# Patient Record
Sex: MALE | Race: WHITE | NOT HISPANIC OR LATINO | Employment: FULL TIME | URBAN - METROPOLITAN AREA
[De-identification: names, ages, dates, MRNs, and addresses within clinical notes are randomized per-mention and may not be internally consistent; named-entity substitution may affect disease eponyms.]

---

## 2017-02-07 ENCOUNTER — OFFICE VISIT (OUTPATIENT)
Dept: OTOLARYNGOLOGY | Facility: CLINIC | Age: 72
End: 2017-02-07
Payer: MEDICARE

## 2017-02-07 VITALS
HEART RATE: 51 BPM | DIASTOLIC BLOOD PRESSURE: 73 MMHG | WEIGHT: 188.06 LBS | SYSTOLIC BLOOD PRESSURE: 141 MMHG | BODY MASS INDEX: 24.81 KG/M2

## 2017-02-07 DIAGNOSIS — Z85.818 HISTORY OF PAROTID CANCER: Primary | ICD-10-CM

## 2017-02-07 DIAGNOSIS — H60.391 INFECTIOUS OTITIS EXTERNA, RIGHT: ICD-10-CM

## 2017-02-07 DIAGNOSIS — H61.301 EXTERNAL EAR CANAL STENOSIS, ACQUIRED, RIGHT: ICD-10-CM

## 2017-02-07 DIAGNOSIS — G51.0 FACIAL PARALYSIS ON RIGHT SIDE: ICD-10-CM

## 2017-02-07 DIAGNOSIS — H91.93 BILATERAL HEARING LOSS, UNSPECIFIED HEARING LOSS TYPE: ICD-10-CM

## 2017-02-07 DIAGNOSIS — H61.22 IMPACTED CERUMEN OF LEFT EAR: ICD-10-CM

## 2017-02-07 PROCEDURE — 69210 REMOVE IMPACTED EAR WAX UNI: CPT | Mod: S$GLB,,, | Performed by: OTOLARYNGOLOGY

## 2017-02-07 PROCEDURE — 99999 PR PBB SHADOW E&M-EST. PATIENT-LVL II: CPT | Mod: PBBFAC,,, | Performed by: OTOLARYNGOLOGY

## 2017-02-07 PROCEDURE — 99204 OFFICE O/P NEW MOD 45 MIN: CPT | Mod: 25,S$GLB,, | Performed by: OTOLARYNGOLOGY

## 2017-02-07 RX ORDER — CIPROFLOXACIN AND DEXAMETHASONE 3; 1 MG/ML; MG/ML
4 SUSPENSION/ DROPS AURICULAR (OTIC) 2 TIMES DAILY
Qty: 7.5 ML | Refills: 6 | Status: SHIPPED | OUTPATIENT
Start: 2017-02-07 | End: 2017-02-17

## 2017-02-07 RX ORDER — ATENOLOL 50 MG/1
50 TABLET ORAL 2 TIMES DAILY
Refills: 1 | COMMUNITY
Start: 2017-01-17

## 2017-02-07 NOTE — PROGRESS NOTES
Referring Provider:    Belinda Buenrostro  [facility] [place]  [address] [place] [address]    Subjective:   Patient: Taqueria Lee [id number], :1945   Visit date:2017 5:06 PM    Chief Complaint:  Hearing Loss    HPI:  Taqueria is a 71 y.o. male who I was asked to see in consultation for evaluation of the following issue(s):    This is a very pleasant gentleman sitting me today for the first time.  He has several different head and neck issues.  Several years ago, he had a right total parotidectomy for malignancy followed by 75 Gy radiation therapy adjuvantly.  Since then, he has had a right facial paralysis.  He is unable to completely close the eye but he uses artificial tears and overall has done fairly well with this.  He has had no noticeable swelling in the neck or enlarged lymph nodes.  His weight has been stable.  Since completion of therapy, he has had severe narrowing of the right external auditory canal.  He has had multiple issues with the ear becoming occluded with wax or other debris.  At some point in the past, he tried to use a mixture of peroxide and alcohol preventative fluid but he is not really performed any type of therapy in the last few years.  He reports that over the past month or so, the left ear has become more obstructed.  He feels like his hearing on this side is decreased.  On the right side he has had some itching.    Review of Systems:  Negative unless checked off.  Gen:  []fever   []fatigue  HENT:  []nosebleeds  []dental problem   Eyes:  []photophobia  []visual disturbance  Resp:  []chest tightness []wheezing  Card:  []chest pain  []leg swelling  GI:  []abdominal pain []blood in stool  :  []dysuria  []hematuria  Musc:  []joint swelling  []gait problem  Skin:  []color change  []pallor  Neuro:  []seizures  []numbness  Hem:  []bruise/bleed easily  Psych:  []hallucinations  []behavioral problems  Allergy/Imm: has No Known  Allergies.    His meds, allergies, medical, surgical, social & family histories were reviewed & updated:  -     He has a current medication list which includes the following prescription(s): atenolol and ciprofloxacin-dexamethasone 0.3-0.1%.  -     He  has a past medical history of Hypertension and Skin cancer.   -     He  does not have any pertinent problems on file.   -     He  has a past surgical history that includes Hernia repair; Skin cancer excision; and Salivary gland surgery.  -     He  reports that he has been smoking Cigarettes.  He has been smoking about 1.00 pack per day. He does not have any smokeless tobacco history on file. He reports that he does not drink alcohol or use illicit drugs.  -     His family history includes COPD in his father; Heart disease in his father and mother; Hypertension in his father and mother; Stroke in his father.  -     He has No Known Allergies.    Objective:     Physical Exam:  Vitals:    Visit Vitals    BP (!) 141/73    Pulse (!) 51    Wt 85.3 kg (188 lb 0.8 oz)    BMI 24.81 kg/m2     General appearance:  Well developed, well nourished    Eyes:  Extraocular motions intact, PERRL    Communication:  no hoarseness, no dysphonia    Ears:  Right external auditory canals stenotic at the meatus.  Within the ear canal there is copious accumulation of foul smelling debris as well as some associated inflammation of the external auditory canal.  This was carefully cleaned and the tympanic membrane appeared intact.  The left external auditory canal had a large volume of cerumen without any evidence of infection.  Anatomically the ear canal is normal.  The tympanic membrane was normal and the left side.  Nose:  No masses/lesions of external nose, nasal mucosa, septum, and turbinates were within normal limits.  Mouth:  No mass/lesion of lips, teeth, gums, hard/soft palate, tongue, tonsils, or oropharynx.    Cardiovascular:  No pedal edema; Radial Pulses +2     Neck & Lymphatics:   No cervical lymphadenopathy, no neck mass/crepitus/ asymmetry, trachea is midline, no thyroid enlargement/tenderness/mass.  Significant contour defect of the right side of the face with well-healed incisions indicating a modified Luisito incision combined with an extension for what was most likely a modified radical neck dissection.  There is no evidence of palpable masses within the parotid bed or within the neck bilaterally.    Psych: Oriented x3,  Alert with normal mood and affect.     Respiration/Chest:  Symmetric expansion during respiration, normal respiratory effort.    Skin:  Warm and intact. No ulcerations of face, scalp, neck.    Facial nerve function is House Brackmann score of 6 out of 6.    Assessment & Plan:   Taqueria was seen today for hearing loss.    Diagnoses and all orders for this visit:    History of parotid cancer    External ear canal stenosis, acquired, right    Infectious otitis externa, right  -     ciprofloxacin-dexamethasone 0.3-0.1% (CIPRODEX) 0.3-0.1 % DrpS; Place 4 drops into the right ear 2 (two) times daily.    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  There is no evidence of residual or recurrent disease.    Ear Canal Stenosis-  overall, he has significant symptoms related to this with the accumulation of debris within the ear canal which combined with lack of aeration of the ear canal makes him at considerable risk for recurrent or chronic otitis externa.  He has not been on any type of management therapy to try to prevent this.  It is also unclear if he would have normal hearing when the ear is not infected.  I recommended that he obtain an audiogram after the current infection has resolved.  I have also recommended that he try to maintain the ear with a combination of distilled  vinegar and rubbing alcohol which can assist in drying the ear canal as well as maintain a normal pH.  If he continues to have ear infections despite conservative management or if this is leading to significant hearing impairment (including the inability to place a hearing aid within the ear canal) we could consider meatoplasty in order to attempt to provide a more widely patent ear canal.  At this point, I would recommend trying conservative measures and observe over the next few months.  If he continues to have significant issues with this, we can discuss surgical options at that point.    OTITIS EXTERNA-  Taqueria has evidence of right otitis externa.   We discussed the need for dry ear precautions. For acute infection, antibiotic therapy is needed.   It remains unclear if this is bacterial or fungal in nature.  I do suspect this is likely bacterial.  I have placed him on a course of Ciprodex.  We will have him return in about 2 weeks.  If the ear appears infected at that point, I would recommend that we obtain cultures and empirically start him on clotrimazole.     -HEARING LOSS-  I spent a considerable amount of time educating the patient on hearing and hearing loss.  We discussed the basic characteristics of conductive hearing loss versus sensorineural hearing loss and the significant differences in treatment options between the two categories.      We discussed that in cases of conductive hearing loss, this suggests a mechanical disorder that sometimes can be improved with medications &/or surgery.  It may occur secondary to external ear pathology (atresia, otitis externa, etc), tympanic membrane disorders (large perforations, immobility due to scarring or eustachian tube dysfunction), and middle ear disorders (effusions, ossicular disorders).    Sensorineural hearing loss is the expected hearing loss pattern with aging, but some disorders such as Meniere's may accelerate this process.  Additionally,  amplification with hearing aids is generally the best option for hearing rehabilitation, except where the hearing loss is profound.  We discussed that this generally does not represent a dangerous condition, but in cases where there is a large discrepancy between the two ears in terms of nerve function, more investigation is often necessary due to the possiblity of vestibular schwannomas or meningiomas at the cerebellopontine angle.  The definitive test for this is an MRI with gadolinium.  However, these masses are usually very slow growing (1-2mm/year), so patients may elect to repeat an audiogram in about 6 months or obtain an ABR so long as they understand that this may result in a delay in diagnosis (although very unlikely that this would have a significant clinical impact on their outcome due to the slow growing nature of these masses) with the understanding that if ABR is abnormal or asymmetry increases, an MRI would then be required.    Taqueria  presents with what appears to be bilateral hearing loss.  Based on this, my recommendation is audiogram after infection on the right has resolved.   Likely sensorineural bilaterally, but hearing amplification may be difficult in the right ear.     -     Cerumen Impaction - Taqueria has cerumen in the left ear(s).  We discussed preventative measures and treatment options.  Q-tips must be avoided, instead the ears can be cleaned with OTC ear rinses (or mineral oil).  If the cerumen impacts the ear canal and causes hearing loss or infection he needs to follow-up in the clinic for treatment and cleaning.      We discussed his medical conditions, treatments and plan.  Taqueria should return to clinic if any issues arise (symptoms worsen or persist), otherwise we will see him back in the clinic In 2 weeks.    Thank you for allowing me to participate in the care of Taqueria.    Sumit Diaz MD        Patient: Taqueria [place] Jesus [id number], :1945  Procedure date:2017  Patient's  medications, allergies, past medical, surgical, social and family histories were reviewed and updated as appropriate.  Chief Complaint:  Hearing Loss    HPI:  Taqueria is a 71 y.o. male with the history of present illness as discussed in the clinic note from today.  During this unrelated patient encounter I observed impacted cerumen.  The otoscopic examination of the tympanic membrane was not possible due to copious cerumen impaction.      Procedure: The patient was in agreement with the examination and debridement of the ears. Removal of the cerumen required a high level of expertise and use of an operating microscope and multiple micro-instruments.     With the patient in the supine position, we used the operating microscope to examine both ears with the appropriate sized ear speculum.  A variety of sterile, micro-instruments were utilized to remove the cerumen atraumatically.  I performed the procedure which required a significant amount of time and effort. The tympanic membrane was then well visualized.  The patient tolerated the procedure well and there were no complications.    Findings:   Right ear had significant wax, the EAC was discharge present, macerated, excoriated and stenotic at the meatus, and the tympanic membrane was intact with no evidence of middle ear fluid.    Left ear had significant wax, the EAC was normal, and the tympanic membrane was intact with no evidence of middle ear fluid.

## 2017-02-07 NOTE — MR AVS SNAPSHOT
[facility]  [address]  [place] [address]  Phone: [phone]  Fax: [phone]                  Taqueria Lee   2017 3:45 PM   Office Visit    Description:  Male : 1945   Provider:  Sumit Diaz MD   Department:  [facility]           Reason for Visit     Hearing Loss                To Do List           Future Appointments        Provider Department Dept Phone    2017 8:20 AM Jaye Whitten PA-C [facility] [phone]      Goals (5 Years of Data)     None       These Medications        Disp Refills Start End    ciprofloxacin-dexamethasone 0.3-0.1% (CIPRODEX) 0.3-0.1 % DrpS 7.5 mL 6 2017    Place 4 drops into the right ear 2 (two) times daily. - Right Ear    Pharmacy: [id number] - [place] - [address] AT [facility] Ph #: [phone]         [facility] On Call     [facility] On Call Nurse Care Line -  Assistance  Registered nurses in the [facility] On Call Center provide clinical advisement, health education, appointment booking, and other advisory services.  Call for this free service at [phone].             Medications           Message regarding Medications     Verify the changes and/or additions to your medication regime listed below are the same as discussed with your clinician today.  If any of these changes or additions are incorrect, please notify your healthcare provider.        START taking these NEW medications        Refills    ciprofloxacin-dexamethasone 0.3-0.1% (CIPRODEX) 0.3-0.1 % DrpS 6    Sig: Place 4 drops into the right ear 2 (two) times daily.    Class: Normal    Route: Right Ear      STOP taking these medications     meclizine (ANTIVERT) 25 mg tablet Take 1 tablet (25 mg total) by mouth 3 (three) times daily as needed.    amlodipine (NORVASC) 5 MG tablet Take 5 mg by mouth once daily.    lisinopril (PRINIVIL,ZESTRIL) 20  MG tablet Take 20 mg by mouth once daily.           Verify that the below list of medications is an accurate representation of the medications you are currently taking.  If none reported, the list may be blank. If incorrect, please contact your healthcare provider. Carry this list with you in case of emergency.           Current Medications     atenolol (TENORMIN) 50 MG tablet Take 50 mg by mouth 2 (two) times daily.    ciprofloxacin-dexamethasone 0.3-0.1% (CIPRODEX) 0.3-0.1 % DrpS Place 4 drops into the right ear 2 (two) times daily.           Clinical Reference Information           Your Vitals Were     BP Pulse Weight BMI       141/73 51 85.3 kg (188 lb 0.8 oz) 24.81 kg/m2       Blood Pressure          Most Recent Value    BP  (!)  141/73      Allergies as of 2/7/2017     No Known Allergies      Immunizations Administered on Date of Encounter - 2/7/2017     None      [facility] Sign-Up     Activating your [facility] account is as easy as 1-2-3!     1) Visit [email or web address], select Sign Up Now, enter this activation code and your date of birth, then select Next.  [email or web address]  Expires: 3/24/2017  4:51 PM      2) Create a username and password to use when you visit [facility] in the future and select a security question in case you lose your password and select Next.    3) Enter your e-mail address and click Sign Up!    Additional Information  If you have questions, please e-mail [email or web address] or call [phone] to talk to our [facility] staff. Remember, [facility] is NOT to be used for urgent needs. For medical emergencies, dial 911.         Smoking Cessation     If you would like to quit smoking:   You may be eligible for free services if you are a [place] resident and started smoking cigarettes before September 1, 1988.  Call the [employer]oking [employer]essation [employer] (S[employer]) toll free at (109) 462-1336 or (392) 365-2566.   Call 9-000-QUIT-NOW if you do not meet the above criteria.            Language Assistance  Services     ATTENTION: Language assistance services are available, free of charge. Please call [phone].      ATENCIÓN: Si habla espinoza, tiene a lopez disposición servicios gratuitos de asistencia lingüística. Llame al [phone].     CHÚ Ý: N?u b?n nói Ti?ng Vi?t, có các d?ch v? h? tr? ngôn ng? mi?n phí dành cho b?n. G?i s? [phone].         [facility] complies with applicable Federal civil rights laws and does not discriminate on the basis of race, color, national origin, age, disability, or sex.

## 2017-02-21 ENCOUNTER — OFFICE VISIT (OUTPATIENT)
Dept: OTOLARYNGOLOGY | Facility: CLINIC | Age: 72
End: 2017-02-21
Payer: MEDICARE

## 2017-02-21 VITALS
SYSTOLIC BLOOD PRESSURE: 175 MMHG | HEART RATE: 47 BPM | BODY MASS INDEX: 25.16 KG/M2 | TEMPERATURE: 98 F | DIASTOLIC BLOOD PRESSURE: 89 MMHG | WEIGHT: 190.69 LBS

## 2017-02-21 DIAGNOSIS — Z85.818 HISTORY OF PAROTID CANCER: ICD-10-CM

## 2017-02-21 DIAGNOSIS — H61.301: ICD-10-CM

## 2017-02-21 DIAGNOSIS — H60.391 OTHER INFECTIVE OTITIS EXTERNA OF RIGHT EAR, UNSPECIFIED CHRONICITY: Primary | ICD-10-CM

## 2017-02-21 DIAGNOSIS — H91.93 BILATERAL HEARING LOSS, UNSPECIFIED HEARING LOSS TYPE: ICD-10-CM

## 2017-02-21 PROCEDURE — 99213 OFFICE O/P EST LOW 20 MIN: CPT | Mod: S$GLB,,, | Performed by: PHYSICIAN ASSISTANT

## 2017-02-21 PROCEDURE — 99999 PR PBB SHADOW E&M-EST. PATIENT-LVL III: CPT | Mod: PBBFAC,,, | Performed by: PHYSICIAN ASSISTANT

## 2017-02-21 NOTE — PROGRESS NOTES
Subjective:   Patient: Taqueria Lee [id number], :1945   Visit date:2017 8:36 AM    Chief Complaint:  Follow-up    HPI:  Taqueria is a 71 y.o. male who is here for follow-up. He was last here on 17 with Dr. Diaz and was started on Ciprodex for FRANCY.  He reports no new issues since we saw him last.  Denies ear pain or drainage.     He has significant history with several different head and neck issues. Several years ago, he had a right total parotidectomy for malignancy followed by 75 Gy radiation therapy adjuvantly. Since then, he has had a right facial paralysis. He is unable to completely close the eye but he uses artificial tears and overall has done fairly well with this. He has had no noticeable swelling in the neck or enlarged lymph nodes. His weight has been stable. Since completion of therapy, he has had severe narrowing of the right external auditory canal. He has had multiple issues with the ear becoming occluded with wax or other debris. At some point in the past, he tried to use a mixture of peroxide and alcohol preventative fluid but he is not really performed any type of therapy in the last few years. He reports that over the past month or so, the left ear has become more obstructed. He feels like his hearing on this side is decreased. On the right side he has had some itching.    Review of Systems:  -     Allergic/Immunologic: has No Known Allergies..  -     Constitutional: Current temp: 97.8 °F (36.6 °C) (Tympanic)    His meds, allergies, medical, surgical, social & family histories were reviewed & updated:  -     He has a current medication list which includes the following prescription(s): atenolol.  -     He  has a past medical history of Hypertension and Skin cancer.   -     He  does not have any pertinent problems on file.   -     He  has a past surgical history that includes Hernia repair; Skin cancer excision; and Salivary gland surgery.  -     He  reports that he has been  smoking Cigarettes.  He has been smoking about 1.00 pack per day. He does not have any smokeless tobacco history on file. He reports that he does not drink alcohol or use illicit drugs.  -     His family history includes COPD in his father; Heart disease in his father and mother; Hypertension in his father and mother; Stroke in his father.  -     He has No Known Allergies.    Objective:     Physical Exam:  Vitals:    Visit Vitals    BP (!) 175/89    Pulse (!) 47    Temp 97.8 °F (36.6 °C) (Tympanic)    Wt 86.5 kg (190 lb 11.2 oz)    BMI 25.16 kg/m2     Appearance:  Well-developed, well-nourished.  Communication:  Able to communicate, no hoarseness.  Head & Face:  Normocephalic, atraumatic, no sinus tenderness, normal facial strength.  Eyes:  Extraocular motions intact.  Ears:  Right external auditory canals stenotic at the meatus. Within the ear canal there is no debris or associated inflammation of the external auditory canal.  The tympanic membrane appeared intact. The left external auditory canal is normal. The tympanic membrane was normal.  Nose:  No masses/lesions of external nose, nasal mucosa, septum, and turbinates were within normal limits.  Mouth:  No mass/lesion of lips, teeth, gums, hard/soft palate, tongue, tonsils, or oropharynx.  Neck & Lymphatics:  No cervical lymphadenopathy, no neck mass/crepitus/ asymmetry, trachea is midline, no thyroid enlargement/tenderness/mass.  Significant contour defect of the right side of the face with well-healed incisions indicating a modified Luisito incision combined with an extension for what was most likely a modified radical neck dissection. There is no evidence of palpable masses within the parotid bed or within the neck bilaterally.  Neuro/Psych: Alert with normal mood and affect.   Abdominal: Normal appearance.   Respiration/Chest:  Symmetric expansion during respiration, normal respiratory effort.  Skin:  Warm and intact  Cardiovascular:  No peripheral  vascular edema or varicosities.    Assessment & Plan:   Taqueria was seen today for follow-up.    Diagnoses and all orders for this visit:    Other infective otitis externa of right ear, unspecified chronicity    Acquired stenosis of external ear canal, right    Bilateral hearing loss, unspecified hearing loss type    History of parotid cancer      1.  Right OE:  Looks much better today; no longer with inflammation.  He can discontinue the Ciprodex drops.  2.  Right ear canal stenosis: Overall, he has significant symptoms related to this with the accumulation of debris within the ear canal which combined with lack of aeration of the ear canal makes him at considerable risk for recurrent or chronic otitis externa. He has not been on any type of management therapy to try to prevent this. It is also unclear if he would have normal hearing when the ear is not infected. I recommended that he obtain an audiogram after the current infection has resolved. I have also recommended that he try to maintain the ear with a combination of distilled vinegar and rubbing alcohol which can assist in drying the ear canal as well as maintain a normal pH. If he continues to have ear infections despite conservative management or if this is leading to significant hearing impairment (including the inability to place a hearing aid within the ear canal) we could consider meatoplasty in order to attempt to provide a more widely patent ear canal. At this point, I would recommend trying conservative measures and observe over the next few months. If he continues to have significant issues with this, we can discuss surgical options at that point.  3.  Hearing loss:  Discussed scheduling audiogram now that his right OE has cleared.  It is unclear if he would have normal hearing now that the right ear is not infected.  He's not interested in scheduling an audiogram today.  He states that even if audiogram was abnormal, he probably wouldn't do anything  about it anyway.  4.  History of parotid cancer:  At this point, the patient has completed surgical therapy and adjuvant therapy several years ago. There is no evidence of residual or recurrent disease.

## 2017-06-20 ENCOUNTER — CLINICAL SUPPORT (OUTPATIENT)
Dept: SMOKING CESSATION | Facility: CLINIC | Age: 72
End: 2017-06-20
Payer: COMMERCIAL

## 2017-06-20 DIAGNOSIS — F17.200 NICOTINE DEPENDENCE: Primary | ICD-10-CM

## 2017-06-20 PROCEDURE — 99407 BEHAV CHNG SMOKING > 10 MIN: CPT | Mod: S$GLB,,,

## 2022-12-06 ENCOUNTER — PATIENT MESSAGE (OUTPATIENT)
Dept: RESEARCH | Facility: HOSPITAL | Age: 77
End: 2022-12-06